# Patient Record
Sex: MALE | Race: ASIAN | NOT HISPANIC OR LATINO | URBAN - METROPOLITAN AREA
[De-identification: names, ages, dates, MRNs, and addresses within clinical notes are randomized per-mention and may not be internally consistent; named-entity substitution may affect disease eponyms.]

---

## 2020-06-29 ENCOUNTER — OFFICE VISIT (OUTPATIENT)
Dept: URBAN - METROPOLITAN AREA TELEHEALTH 2 | Facility: TELEHEALTH | Age: 35
End: 2020-06-29

## 2020-12-03 ENCOUNTER — LAB OUTSIDE AN ENCOUNTER (OUTPATIENT)
Dept: URBAN - METROPOLITAN AREA CLINIC 121 | Facility: CLINIC | Age: 35
End: 2020-12-03

## 2020-12-20 LAB
ABSOLUTE BASOPHILS: (no result)
ABSOLUTE EOSINOPHILS: (no result)
ABSOLUTE LYMPHOCYTES: (no result)
ABSOLUTE MONOCYTES: (no result)
ABSOLUTE NEUTROPHILS: (no result)
ALBUMIN/GLOBULIN RATIO: (no result)
ALBUMIN: (no result)
ALKALINE PHOSPHATASE: (no result)
ALPHA FETOPROTEIN, TUMOR MARKER: (no result)
ALT: (no result)
AST: (no result)
BASOPHILS: (no result)
BILIRUBIN, TOTAL: (no result)
BUN/CREATININE RATIO: (no result)
CALCIUM: (no result)
CARBON DIOXIDE: (no result)
CHLORIDE: (no result)
CREATININE: (no result)
EGFR AFRICAN AMERICA: (no result)
EGFR NON-AFR. AMERICAN: (no result)
EOSINOPHILS: (no result)
GLOBULIN: (no result)
GLUCOSE: (no result)
HEMATOCRIT: (no result)
HEMOGLOBIN: (no result)
HEPATITIS B VIRUS DNA: (no result)
INR: 1
LYMPHOCYTES: (no result)
MCH: (no result)
MCHC: (no result)
MCV: (no result)
MONOCYTES: (no result)
MPV: (no result)
NEUTROPHILS: (no result)
PLATELET COUNT: (no result)
POTASSIUM: (no result)
PROTEIN, TOTAL: (no result)
PT: (no result)
RDW: (no result)
RED BLOOD CELL COUNT: (no result)
SODIUM: (no result)
UREA NITROGEN (BUN): (no result)
WHITE BLOOD CELL COUNT: (no result)

## 2021-01-11 ENCOUNTER — OFFICE VISIT (OUTPATIENT)
Dept: URBAN - METROPOLITAN AREA TELEHEALTH 2 | Facility: TELEHEALTH | Age: 36
End: 2021-01-11

## 2021-06-08 ENCOUNTER — LAB OUTSIDE AN ENCOUNTER (OUTPATIENT)
Dept: URBAN - METROPOLITAN AREA CLINIC 121 | Facility: CLINIC | Age: 36
End: 2021-06-08

## 2021-06-24 LAB
ABSOLUTE BASOPHILS: (no result)
ABSOLUTE EOSINOPHILS: (no result)
ABSOLUTE LYMPHOCYTES: (no result)
ABSOLUTE MONOCYTES: (no result)
ABSOLUTE NEUTROPHILS: (no result)
ALBUMIN/GLOBULIN RATIO: (no result)
ALBUMIN: (no result)
ALKALINE PHOSPHATASE: (no result)
ALPHA FETOPROTEIN, TUMOR MARKER: (no result)
ALT: (no result)
AST: (no result)
BASOPHILS: (no result)
BILIRUBIN, TOTAL: (no result)
BUN/CREATININE RATIO: (no result)
CALCIUM: (no result)
CARBON DIOXIDE: (no result)
CHLORIDE: (no result)
CREATININE: (no result)
EGFR AFRICAN AMERIC: (no result)
EGFR NON-AFR. AMERI: (no result)
EOSINOPHILS: (no result)
GLOBULIN: (no result)
GLUCOSE: (no result)
HEMATOCRIT: (no result)
HEMOGLOBIN: (no result)
HEPATITIS B VIRUS DNA: (no result)
INR: 1
LYMPHOCYTES: (no result)
MCH: (no result)
MCHC: (no result)
MCV: (no result)
MONOCYTES: (no result)
MPV: (no result)
NEUTROPHILS: (no result)
PLATELET COUNT: (no result)
POTASSIUM: (no result)
PROTEIN, TOTAL: (no result)
PT: (no result)
RDW: (no result)
RED BLOOD CELL COUNT: (no result)
SODIUM: (no result)
UREA NITROGEN (BUN): (no result)
WHITE BLOOD CELL COUNT: (no result)

## 2021-07-07 ENCOUNTER — OFFICE VISIT (OUTPATIENT)
Dept: URBAN - METROPOLITAN AREA TELEHEALTH 2 | Facility: TELEHEALTH | Age: 36
End: 2021-07-07

## 2021-07-07 ENCOUNTER — OFFICE VISIT (OUTPATIENT)
Dept: URBAN - METROPOLITAN AREA CLINIC 57 | Facility: CLINIC | Age: 36
End: 2021-07-07

## 2021-12-06 ENCOUNTER — LAB OUTSIDE AN ENCOUNTER (OUTPATIENT)
Dept: URBAN - METROPOLITAN AREA CLINIC 121 | Facility: CLINIC | Age: 36
End: 2021-12-06

## 2021-12-17 ENCOUNTER — OFFICE VISIT (OUTPATIENT)
Dept: URBAN - METROPOLITAN AREA CLINIC 63 | Facility: CLINIC | Age: 36
End: 2021-12-17

## 2022-06-15 ENCOUNTER — LAB OUTSIDE AN ENCOUNTER (OUTPATIENT)
Dept: URBAN - METROPOLITAN AREA CLINIC 121 | Facility: CLINIC | Age: 37
End: 2022-06-15

## 2022-07-01 ENCOUNTER — LAB OUTSIDE AN ENCOUNTER (OUTPATIENT)
Dept: URBAN - METROPOLITAN AREA CLINIC 63 | Facility: CLINIC | Age: 37
End: 2022-07-01

## 2022-07-01 ENCOUNTER — OFFICE VISIT (OUTPATIENT)
Dept: URBAN - METROPOLITAN AREA CLINIC 63 | Facility: CLINIC | Age: 37
End: 2022-07-01
Payer: COMMERCIAL

## 2022-07-01 VITALS
OXYGEN SATURATION: 87 % | HEIGHT: 66 IN | HEART RATE: 87 BPM | WEIGHT: 113.8 LBS | DIASTOLIC BLOOD PRESSURE: 80 MMHG | SYSTOLIC BLOOD PRESSURE: 120 MMHG | TEMPERATURE: 98.1 F | BODY MASS INDEX: 18.29 KG/M2

## 2022-07-01 DIAGNOSIS — B18.1 CHRONIC HEPATITIS B: ICD-10-CM

## 2022-07-01 PROCEDURE — 99213 OFFICE O/P EST LOW 20 MIN: CPT | Performed by: INTERNAL MEDICINE

## 2022-07-01 NOTE — HPI-TODAY'S VISIT:
Here in follow-up for history of chronic inactive hepatitis B. He continues to do well with no signs or symptoms of decompensated liver disease. Recent LFTs are normal, his viral load is extremely low. His ultrasound showed fatty liver, no new lesions identified. He does occasionally complain of a right upper quadrant pain in his ribs, which is dull  and constant, can last up to a few days. No relation to eating.  No other complaints reported.

## 2022-07-09 ENCOUNTER — TELEPHONE ENCOUNTER (OUTPATIENT)
Dept: URBAN - METROPOLITAN AREA CLINIC 121 | Facility: CLINIC | Age: 37
End: 2022-07-09

## 2022-07-10 ENCOUNTER — TELEPHONE ENCOUNTER (OUTPATIENT)
Dept: URBAN - METROPOLITAN AREA CLINIC 121 | Facility: CLINIC | Age: 37
End: 2022-07-10

## 2022-12-22 ENCOUNTER — TELEPHONE ENCOUNTER (OUTPATIENT)
Dept: URBAN - METROPOLITAN AREA CLINIC 63 | Facility: CLINIC | Age: 37
End: 2022-12-22

## 2022-12-22 ENCOUNTER — LAB OUTSIDE AN ENCOUNTER (OUTPATIENT)
Dept: URBAN - METROPOLITAN AREA CLINIC 63 | Facility: CLINIC | Age: 37
End: 2022-12-22

## 2022-12-22 PROBLEM — 61977001: Status: ACTIVE | Noted: 2022-07-01

## 2022-12-27 LAB
A/G RATIO: 1.6
ABSOLUTE BASOPHILS: 51
ABSOLUTE EOSINOPHILS: 70
ABSOLUTE LYMPHOCYTES: 2413
ABSOLUTE MONOCYTES: 301
ABSOLUTE NEUTROPHILS: 3565
AFP, SERUM, TUMOR MARKER: 2
ALBUMIN: 4.8
ALKALINE PHOSPHATASE: 53
ALT (SGPT): 57
AST (SGOT): 27
BASOPHILS: 0.8
BILIRUBIN, TOTAL: 0.7
BUN/CREATININE RATIO: (no result)
BUN: 11
CALCIUM: 10.3
CARBON DIOXIDE, TOTAL: 28
CHLORIDE: 103
CREATININE: 0.82
EGFR: 116
EOSINOPHILS: 1.1
GLOBULIN, TOTAL: 3
GLUCOSE: 114
HEMATOCRIT: 47.1
HEMOGLOBIN: 16.2
HEPATITIS B VIRUS DNA: 1.56
HEPATITIS B VIRUS DNA: 36
LYMPHOCYTES: 37.7
MCH: 30.6
MCHC: 34.4
MCV: 88.9
MONOCYTES: 4.7
MPV: 10
NEUTROPHILS: 55.7
PLATELET COUNT: 369
POTASSIUM: 4.8
PROTEIN, TOTAL: 7.8
RDW: 13.1
RED BLOOD CELL COUNT: 5.3
SODIUM: 141
WHITE BLOOD CELL COUNT: 6.4

## 2022-12-28 ENCOUNTER — LAB OUTSIDE AN ENCOUNTER (OUTPATIENT)
Dept: URBAN - METROPOLITAN AREA CLINIC 63 | Facility: CLINIC | Age: 37
End: 2022-12-28

## 2022-12-30 ENCOUNTER — OFFICE VISIT (OUTPATIENT)
Dept: URBAN - METROPOLITAN AREA CLINIC 63 | Facility: CLINIC | Age: 37
End: 2022-12-30

## 2023-01-01 ENCOUNTER — LAB OUTSIDE AN ENCOUNTER (OUTPATIENT)
Dept: URBAN - METROPOLITAN AREA CLINIC 63 | Facility: CLINIC | Age: 38
End: 2023-01-01

## 2023-05-01 ENCOUNTER — WEB ENCOUNTER (OUTPATIENT)
Dept: URBAN - METROPOLITAN AREA CLINIC 63 | Facility: CLINIC | Age: 38
End: 2023-05-01

## 2023-05-02 ENCOUNTER — WEB ENCOUNTER (OUTPATIENT)
Dept: URBAN - METROPOLITAN AREA CLINIC 63 | Facility: CLINIC | Age: 38
End: 2023-05-02

## 2023-05-02 ENCOUNTER — LAB OUTSIDE AN ENCOUNTER (OUTPATIENT)
Dept: URBAN - METROPOLITAN AREA CLINIC 63 | Facility: CLINIC | Age: 38
End: 2023-05-02

## 2023-06-02 ENCOUNTER — TELEPHONE ENCOUNTER (OUTPATIENT)
Dept: URBAN - METROPOLITAN AREA CLINIC 63 | Facility: CLINIC | Age: 38
End: 2023-06-02

## 2023-06-05 LAB
A/G RATIO: 1.8
ABSOLUTE BASOPHILS: 70
ABSOLUTE EOSINOPHILS: 58
ABSOLUTE LYMPHOCYTES: 2709
ABSOLUTE MONOCYTES: 348
ABSOLUTE NEUTROPHILS: 2616
AFP, SERUM, TUMOR MARKER: 1.9
ALBUMIN: 4.9
ALKALINE PHOSPHATASE: 53
ALT (SGPT): 62
AST (SGOT): 30
BASOPHILS: 1.2
BILIRUBIN, TOTAL: 0.4
BUN/CREATININE RATIO: (no result)
BUN: 10
CALCIUM: 9.8
CARBON DIOXIDE, TOTAL: 24
CHLORIDE: 106
CREATININE: 0.88
EGFR: 114
EOSINOPHILS: 1
GLOBULIN, TOTAL: 2.8
GLUCOSE: 69
HEMATOCRIT: 45.8
HEMOGLOBIN: 16.3
LYMPHOCYTES: 46.7
MCH: 32.5
MCHC: 35.6
MCV: 91.4
MONOCYTES: 6
MPV: 10.1
NEUTROPHILS: 45.1
PLATELET COUNT: 318
POTASSIUM: 4.7
PROTEIN, TOTAL: 7.7
RDW: 12.6
RED BLOOD CELL COUNT: 5.01
SODIUM: 141
WHITE BLOOD CELL COUNT: 5.8

## 2023-06-06 LAB
HEPATITIS B VIRUS DNA: 1.78
HEPATITIS B VIRUS DNA: 61

## 2023-06-09 ENCOUNTER — WEB ENCOUNTER (OUTPATIENT)
Dept: URBAN - METROPOLITAN AREA CLINIC 63 | Facility: CLINIC | Age: 38
End: 2023-06-09

## 2023-06-09 ENCOUNTER — OFFICE VISIT (OUTPATIENT)
Dept: URBAN - METROPOLITAN AREA CLINIC 63 | Facility: CLINIC | Age: 38
End: 2023-06-09
Payer: COMMERCIAL

## 2023-06-09 ENCOUNTER — LAB OUTSIDE AN ENCOUNTER (OUTPATIENT)
Dept: URBAN - METROPOLITAN AREA CLINIC 63 | Facility: CLINIC | Age: 38
End: 2023-06-09

## 2023-06-09 ENCOUNTER — DASHBOARD ENCOUNTERS (OUTPATIENT)
Age: 38
End: 2023-06-09

## 2023-06-09 VITALS
BODY MASS INDEX: 18.96 KG/M2 | OXYGEN SATURATION: 99 % | DIASTOLIC BLOOD PRESSURE: 80 MMHG | HEIGHT: 66 IN | WEIGHT: 118 LBS | HEART RATE: 80 BPM | TEMPERATURE: 98 F | SYSTOLIC BLOOD PRESSURE: 120 MMHG

## 2023-06-09 DIAGNOSIS — K21.9 CHRONIC GERD: ICD-10-CM

## 2023-06-09 DIAGNOSIS — B18.1 CHRONIC HEPATITIS B: ICD-10-CM

## 2023-06-09 DIAGNOSIS — K62.5 RECTAL BLEEDING: ICD-10-CM

## 2023-06-09 PROCEDURE — 99213 OFFICE O/P EST LOW 20 MIN: CPT | Performed by: INTERNAL MEDICINE

## 2023-06-09 PROCEDURE — 99214 OFFICE O/P EST MOD 30 MIN: CPT | Performed by: INTERNAL MEDICINE

## 2023-06-09 NOTE — HPI-TODAY'S VISIT:
Here for 6 monthly follow-up for history of chronic inactive hepatitis B, E antigen negative, E antibody positive. No signs or symptoms of decompensated liver disease reported. He does have some intermittent heartburn or reflux symptoms especially when he eats pizza. Symptoms are resolved with taking Pepcid. He continues to have this pain in the right subcostal area which is a dull nagging pain which can be constant. No clear relation to meals. Rare bright red blood when he wipes, no diarrhea or constipation reported. No unusual weight loss reported. Under a lot of stress as his sister who is 39 was recently diagnosed with stage IV lung cancer.

## 2023-06-28 ENCOUNTER — LAB OUTSIDE AN ENCOUNTER (OUTPATIENT)
Dept: URBAN - METROPOLITAN AREA CLINIC 63 | Facility: CLINIC | Age: 38
End: 2023-06-28

## 2023-07-27 ENCOUNTER — TELEPHONE ENCOUNTER (OUTPATIENT)
Dept: URBAN - METROPOLITAN AREA CLINIC 63 | Facility: CLINIC | Age: 38
End: 2023-07-27

## 2023-07-27 ENCOUNTER — LAB OUTSIDE AN ENCOUNTER (OUTPATIENT)
Dept: URBAN - METROPOLITAN AREA SURGERY CENTER 4 | Facility: SURGERY CENTER | Age: 38
End: 2023-07-27

## 2023-07-27 ENCOUNTER — CLAIMS CREATED FROM THE CLAIM WINDOW (OUTPATIENT)
Dept: URBAN - METROPOLITAN AREA SURGERY CENTER 4 | Facility: SURGERY CENTER | Age: 38
End: 2023-07-27
Payer: COMMERCIAL

## 2023-07-27 ENCOUNTER — CLAIMS CREATED FROM THE CLAIM WINDOW (OUTPATIENT)
Dept: URBAN - METROPOLITAN AREA CLINIC 4 | Facility: CLINIC | Age: 38
End: 2023-07-27
Payer: COMMERCIAL

## 2023-07-27 DIAGNOSIS — K64.0 GRADE I HEMORRHOIDS: ICD-10-CM

## 2023-07-27 DIAGNOSIS — K92.1 MELENA: ICD-10-CM

## 2023-07-27 DIAGNOSIS — B18.1 CHRONIC HEPATITIS B: ICD-10-CM

## 2023-07-27 DIAGNOSIS — K31.89 OTHER DISEASES OF STOMACH AND DUODENUM: ICD-10-CM

## 2023-07-27 DIAGNOSIS — K92.2 GI BLEEDING: ICD-10-CM

## 2023-07-27 DIAGNOSIS — K44.9 HIATAL HERNIA: ICD-10-CM

## 2023-07-27 DIAGNOSIS — K92.1 HEMATOCHEZIA: ICD-10-CM

## 2023-07-27 PROCEDURE — 43239 EGD BIOPSY SINGLE/MULTIPLE: CPT | Performed by: INTERNAL MEDICINE

## 2023-07-27 PROCEDURE — 88305 TISSUE EXAM BY PATHOLOGIST: CPT | Performed by: PATHOLOGY

## 2023-07-27 PROCEDURE — 45380 COLONOSCOPY AND BIOPSY: CPT | Performed by: INTERNAL MEDICINE

## 2023-07-27 PROCEDURE — 00813 ANES UPR LWR GI NDSC PX: CPT | Performed by: NURSE ANESTHETIST, CERTIFIED REGISTERED

## 2023-07-27 PROCEDURE — 88312 SPECIAL STAINS GROUP 1: CPT | Performed by: PATHOLOGY

## 2023-12-09 ENCOUNTER — LAB OUTSIDE AN ENCOUNTER (OUTPATIENT)
Dept: URBAN - METROPOLITAN AREA CLINIC 63 | Facility: CLINIC | Age: 38
End: 2023-12-09

## 2023-12-19 ENCOUNTER — WEB ENCOUNTER (OUTPATIENT)
Dept: URBAN - METROPOLITAN AREA CLINIC 63 | Facility: CLINIC | Age: 38
End: 2023-12-19

## 2023-12-25 LAB
A/G RATIO: 1.8
ABSOLUTE BASOPHILS: 58
ABSOLUTE EOSINOPHILS: 180
ABSOLUTE LYMPHOCYTES: 2321
ABSOLUTE MONOCYTES: 371
ABSOLUTE NEUTROPHILS: 2369
AFP, SERUM, TUMOR MARKER: 1.8
ALBUMIN: 4.7
ALKALINE PHOSPHATASE: 51
ALT (SGPT): 81
AST (SGOT): 39
BASOPHILS: 1.1
BILIRUBIN, TOTAL: 0.6
BUN/CREATININE RATIO: (no result)
BUN: 11
CALCIUM: 9.8
CARBON DIOXIDE, TOTAL: 27
CHLORIDE: 103
CREATININE: 0.88
EGFR: 113
EOSINOPHILS: 3.4
GLOBULIN, TOTAL: 2.6
GLUCOSE: 93
HBV LOG10: 1.98
HEMATOCRIT: 45
HEMOGLOBIN: 15.8
HEPATITIS B VIRUS DNA: 96
INR: 1
LYMPHOCYTES: 43.8
MCH: 32.2
MCHC: 35.1
MCV: 91.8
MONOCYTES: 7
MPV: 9.8
NEUTROPHILS: 44.7
PLATELET COUNT: 325
POTASSIUM: 4.6
PROTEIN, TOTAL: 7.3
PT: 10.5
RDW: 12.6
RED BLOOD CELL COUNT: 4.9
SODIUM: 139
WHITE BLOOD CELL COUNT: 5.3

## 2024-01-04 ENCOUNTER — TELEPHONE ENCOUNTER (OUTPATIENT)
Dept: URBAN - METROPOLITAN AREA CLINIC 63 | Facility: CLINIC | Age: 39
End: 2024-01-04

## 2024-01-05 ENCOUNTER — OFFICE VISIT (OUTPATIENT)
Dept: URBAN - METROPOLITAN AREA CLINIC 57 | Facility: CLINIC | Age: 39
End: 2024-01-05

## 2024-01-19 ENCOUNTER — LAB OUTSIDE AN ENCOUNTER (OUTPATIENT)
Dept: URBAN - METROPOLITAN AREA CLINIC 63 | Facility: CLINIC | Age: 39
End: 2024-01-19

## 2024-01-19 ENCOUNTER — TELEPHONE ENCOUNTER (OUTPATIENT)
Dept: URBAN - METROPOLITAN AREA CLINIC 63 | Facility: CLINIC | Age: 39
End: 2024-01-19

## 2024-06-24 ENCOUNTER — TELEPHONE ENCOUNTER (OUTPATIENT)
Dept: URBAN - METROPOLITAN AREA CLINIC 64 | Facility: CLINIC | Age: 39
End: 2024-06-24

## 2024-07-04 ENCOUNTER — LAB OUTSIDE AN ENCOUNTER (OUTPATIENT)
Dept: URBAN - METROPOLITAN AREA CLINIC 63 | Facility: CLINIC | Age: 39
End: 2024-07-04

## 2024-07-11 LAB
A/G RATIO: 1.8
ABSOLUTE BASOPHILS: 61
ABSOLUTE EOSINOPHILS: 99
ABSOLUTE LYMPHOCYTES: 2739
ABSOLUTE MONOCYTES: 402
ABSOLUTE NEUTROPHILS: 2200
AFP, SERUM, TUMOR MARKER: 2.1
ALBUMIN: 4.6
ALKALINE PHOSPHATASE: 49
ALT (SGPT): 43
AST (SGOT): 23
BASOPHILS: 1.1
BILIRUBIN, TOTAL: 0.4
BUN/CREATININE RATIO: (no result)
BUN: 11
CALCIUM: 9.6
CARBON DIOXIDE, TOTAL: 28
CHLORIDE: 106
CREATININE: 0.88
EGFR: 113
EOSINOPHILS: 1.8
GLOBULIN, TOTAL: 2.6
GLUCOSE: 90
HBV LOG10: 2.79
HEMATOCRIT: 46.2
HEMOGLOBIN: 15.8
HEPATITIS B VIRUS DNA: 615
INR: 1
LYMPHOCYTES: 49.8
MCH: 32.2
MCHC: 34.2
MCV: 94.1
MONOCYTES: 7.3
MPV: 9.9
NEUTROPHILS: 40
PLATELET COUNT: 317
POTASSIUM: 5
PROTEIN, TOTAL: 7.2
PT: 10.7
RDW: 12.6
RED BLOOD CELL COUNT: 4.91
SODIUM: 142
WHITE BLOOD CELL COUNT: 5.5

## 2024-07-16 ENCOUNTER — LAB OUTSIDE AN ENCOUNTER (OUTPATIENT)
Dept: URBAN - METROPOLITAN AREA CLINIC 63 | Facility: CLINIC | Age: 39
End: 2024-07-16

## 2024-07-31 ENCOUNTER — OFFICE VISIT (OUTPATIENT)
Dept: URBAN - METROPOLITAN AREA CLINIC 63 | Facility: CLINIC | Age: 39
End: 2024-07-31

## 2024-07-31 VITALS
HEART RATE: 75 BPM | TEMPERATURE: 98.4 F | BODY MASS INDEX: 18.93 KG/M2 | HEIGHT: 66 IN | WEIGHT: 117.8 LBS | DIASTOLIC BLOOD PRESSURE: 80 MMHG | OXYGEN SATURATION: 99 % | RESPIRATION RATE: 20 BRPM | SYSTOLIC BLOOD PRESSURE: 114 MMHG

## 2024-07-31 RX ORDER — FAMOTIDINE 20 MG/1
1 TABLET AT BEDTIME AS NEEDED TABLET, FILM COATED ORAL ONCE A DAY
Status: ACTIVE | COMMUNITY

## 2025-01-28 LAB
ABSOLUTE BASOPHILS: 50
ABSOLUTE EOSINOPHILS: 59
ABSOLUTE LYMPHOCYTES: 1789
ABSOLUTE MONOCYTES: 328
ABSOLUTE NEUTROPHILS: 1974
AFP, SERUM, TUMOR MARKER: 1.8
ALBUMIN/GLOBULIN RATIO: 1.8
ALBUMIN: 4.9
ALKALINE PHOSPHATASE: 58
ALT: 38
AST: 26
BASOPHILS: 1.2
BILIRUBIN, TOTAL: 0.9
BUN/CREATININE RATIO: (no result)
CALCIUM: 9.9
CARBON DIOXIDE: 29
CHLORIDE: 103
CREATININE: 0.91
EGFR: 110
EOSINOPHILS: 1.4
FIB 4 INDEX: 0.47
FIB 4 INTERPRETATION: (no result)
GLOBULIN: 2.8
GLUCOSE: 92
HEMATOCRIT: 46.6
HEMOGLOBIN: 16.1
HEPATITIS B VIRUS DNA: 1.15
HEPATITIS B VIRUS DNA: 14
INR: 1
LYMPHOCYTES: 42.6
MCH: 32.5
MCHC: 34.5
MCV: 94.1
MONOCYTES: 7.8
MPV: 10.4
NEUTROPHILS: 47
PLATELET COUNT: 352
PLATELET COUNT: 352
POTASSIUM: 4.4
PROTEIN, TOTAL: 7.7
PT: 10.9
RDW: 12.4
RED BLOOD CELL COUNT: 4.95
SODIUM: 138
UREA NITROGEN (BUN): 13
WHITE BLOOD CELL COUNT: 4.2

## 2025-01-31 ENCOUNTER — LAB OUTSIDE AN ENCOUNTER (OUTPATIENT)
Dept: URBAN - METROPOLITAN AREA CLINIC 63 | Facility: CLINIC | Age: 40
End: 2025-01-31

## 2025-02-14 ENCOUNTER — LAB OUTSIDE AN ENCOUNTER (OUTPATIENT)
Dept: URBAN - METROPOLITAN AREA CLINIC 63 | Facility: CLINIC | Age: 40
End: 2025-02-14

## 2025-02-28 ENCOUNTER — TELEPHONE ENCOUNTER (OUTPATIENT)
Dept: URBAN - METROPOLITAN AREA CLINIC 63 | Facility: CLINIC | Age: 40
End: 2025-02-28

## 2025-08-06 ENCOUNTER — TELEPHONE ENCOUNTER (OUTPATIENT)
Dept: URBAN - METROPOLITAN AREA CLINIC 63 | Facility: CLINIC | Age: 40
End: 2025-08-06

## 2025-08-15 ENCOUNTER — LAB OUTSIDE AN ENCOUNTER (OUTPATIENT)
Dept: URBAN - METROPOLITAN AREA CLINIC 63 | Facility: CLINIC | Age: 40
End: 2025-08-15

## 2025-08-18 LAB
ABSOLUTE BASOPHILS: 92
ABSOLUTE EOSINOPHILS: 92
ABSOLUTE LYMPHOCYTES: 2254
ABSOLUTE MONOCYTES: 418
ABSOLUTE NEUTROPHILS: 2244
AFP, SERUM, TUMOR MARKER: 2.2
ALBUMIN/GLOBULIN RATIO: 1.9
ALBUMIN: 4.8
ALKALINE PHOSPHATASE: 52
ALT: 54
AST: 32
BASOPHILS: 1.8
BILIRUBIN, TOTAL: 0.5
BUN/CREATININE RATIO: (no result)
CALCIUM: 9.8
CARBON DIOXIDE: 28
CHLORIDE: 107
CREATININE: 0.9
EGFR: 111
EOSINOPHILS: 1.8
FIB 4 INDEX: 0.48
FIB 4 INTERPRETATION: (no result)
FIB 4 SUMMARY: (no result)
GLOBULIN: 2.5
GLUCOSE: 102
HEMATOCRIT: 47.5
HEMOGLOBIN: 16.3
HEPATITIS B VIRUS DNA: 1.77
HEPATITIS B VIRUS DNA: 59
LYMPHOCYTES: 44.2
MCH: 33.2
MCHC: 34.3
MCV: 96.7
MONOCYTES: 8.2
MPV: 10.1
NEUTROPHILS: 44
PLATELET COUNT: 361
PLATELET COUNT: 361
POTASSIUM: 5.2
PROTEIN, TOTAL: 7.3
RDW: 12.6
RED BLOOD CELL COUNT: 4.91
SODIUM: 141
UREA NITROGEN (BUN): 11
WHITE BLOOD CELL COUNT: 5.1

## 2025-08-20 ENCOUNTER — OFFICE VISIT (OUTPATIENT)
Dept: URBAN - METROPOLITAN AREA CLINIC 57 | Facility: CLINIC | Age: 40
End: 2025-08-20

## 2025-08-20 DIAGNOSIS — B18.1 CHRONIC HEPATITIS B: ICD-10-CM

## 2025-08-20 DIAGNOSIS — K76.0 FATTY LIVER: ICD-10-CM

## 2025-08-20 RX ORDER — FAMOTIDINE 20 MG/1
1 TABLET AT BEDTIME AS NEEDED TABLET, FILM COATED ORAL ONCE A DAY
Status: ACTIVE | COMMUNITY

## 2025-08-20 RX ORDER — CARVEDILOL 12.5 MG/1
1 TABLET WITH FOOD TABLET, FILM COATED ORAL TWICE A DAY
Status: ACTIVE | COMMUNITY

## 2025-08-28 ENCOUNTER — LAB OUTSIDE AN ENCOUNTER (OUTPATIENT)
Dept: URBAN - METROPOLITAN AREA CLINIC 63 | Facility: CLINIC | Age: 40
End: 2025-08-28